# Patient Record
Sex: FEMALE | Race: WHITE | NOT HISPANIC OR LATINO | URBAN - METROPOLITAN AREA
[De-identification: names, ages, dates, MRNs, and addresses within clinical notes are randomized per-mention and may not be internally consistent; named-entity substitution may affect disease eponyms.]

---

## 2020-10-01 ENCOUNTER — OFFICE VISIT (OUTPATIENT)
Dept: URBAN - METROPOLITAN AREA CLINIC 17 | Facility: CLINIC | Age: 65
End: 2020-10-01
Payer: COMMERCIAL

## 2020-10-01 DIAGNOSIS — H17.89 OTHER CORNEAL SCARS: ICD-10-CM

## 2020-10-01 DIAGNOSIS — H25.812 COMBINED FORMS OF AGE-RELATED CATARACT, LEFT EYE: Primary | ICD-10-CM

## 2020-10-01 PROCEDURE — 92004 COMPRE OPH EXAM NEW PT 1/>: CPT | Performed by: OPHTHALMOLOGY

## 2020-10-01 ASSESSMENT — VISUAL ACUITY
OD: 20/25
OS: 20/70

## 2020-10-01 ASSESSMENT — KERATOMETRY
OS: 40.00
OD: 40.00

## 2020-10-01 ASSESSMENT — INTRAOCULAR PRESSURE
OD: 16
OS: 17

## 2020-10-01 NOTE — IMPRESSION/PLAN
Impression: Other corneal scars: H17.89. S/P RK OU Plan: Discussed diagnosis in detail with patient. Advised patient of condition. No treatment is required at this time. Will continue to observe condition and or symptoms. Patient instructed to use artificial tears as needed.

## 2020-10-01 NOTE — IMPRESSION/PLAN
Impression: Combined forms of age-related cataract, left eye: H25.812. Condition: established, worsening. Symptoms: could improve with surgery. Plan: Cataract accounts for patient's complaints. Reviewed risks, benefits, and procedure. Patient desires surgery, schedule ce/iol OS, RL2, standard IOL, distance refractive target, patient is clear for surgery in [place] [id number]. Discussed with patient the possible need for second surgery due to history of LASIK. Recommend ORA and Nazario in [place] to help prevent secondary surgery.

## 2020-10-09 ENCOUNTER — PRE-OPERATIVE VISIT (OUTPATIENT)
Dept: URBAN - METROPOLITAN AREA CLINIC 23 | Facility: CLINIC | Age: 65
End: 2020-10-09
Payer: COMMERCIAL

## 2020-10-09 PROCEDURE — 76519 ECHO EXAM OF EYE: CPT | Performed by: OPHTHALMOLOGY

## 2020-10-09 ASSESSMENT — PACHYMETRY
OS: 3.93
OD: 24.75
OD: 3.92
OS: 24.63

## 2020-11-11 ENCOUNTER — SURGERY (OUTPATIENT)
Dept: URBAN - METROPOLITAN AREA SURGERY 11 | Facility: SURGERY | Age: 65
End: 2020-11-11
Payer: COMMERCIAL

## 2020-11-11 PROCEDURE — 66984 XCAPSL CTRC RMVL W/O ECP: CPT | Performed by: OPHTHALMOLOGY

## 2020-11-12 ENCOUNTER — POST-OPERATIVE VISIT (OUTPATIENT)
Dept: URBAN - METROPOLITAN AREA CLINIC 17 | Facility: CLINIC | Age: 65
End: 2020-11-12
Payer: COMMERCIAL

## 2020-11-12 DIAGNOSIS — Z48.810 ENCOUNTER FOR SURGICAL AFTERCARE FOLLOWING SURGERY ON A SENSE ORGAN: Primary | ICD-10-CM

## 2020-11-12 PROCEDURE — 99024 POSTOP FOLLOW-UP VISIT: CPT | Performed by: OPTOMETRIST

## 2020-11-12 ASSESSMENT — INTRAOCULAR PRESSURE
OD: 23
OS: 24

## 2020-11-12 NOTE — IMPRESSION/PLAN
Impression: S/P Cataract Extraction by phacoemulsification with IOL placement/ORA OS - 1 Day. Encounter for surgical aftercare following surgery on a sense organ  Z48.810.  Excellent post op course Plan: --Taper Prednisolone acetate 1% QID OS  x 2 wk, TID x 1 wk, BID x 1wk, QD x 1wk, then d/c
--Continue Ofloxacin 0.3% QID OS

## 2020-11-19 ENCOUNTER — POST-OPERATIVE VISIT (OUTPATIENT)
Dept: URBAN - METROPOLITAN AREA CLINIC 17 | Facility: CLINIC | Age: 65
End: 2020-11-19
Payer: COMMERCIAL

## 2020-11-19 PROCEDURE — 99024 POSTOP FOLLOW-UP VISIT: CPT | Performed by: OPTOMETRIST

## 2020-11-19 ASSESSMENT — INTRAOCULAR PRESSURE
OS: 10
OD: 13

## 2020-11-19 ASSESSMENT — VISUAL ACUITY: OS: 20/30-2

## 2020-11-19 NOTE — IMPRESSION/PLAN
Impression: S/P Cataract Extraction by phacoemulsification with IOL placement/ORA OS - 8 Days. Encounter for surgical aftercare following surgery on a sense organ  Z48.810.  Plan: 3 wks, PO3 --Taper Prednisolone acetate 1% QID x 1 wk, TID x 1 wk, BID x 1wk, QD x 1wk, then d/c

## 2020-12-10 ENCOUNTER — POST-OPERATIVE VISIT (OUTPATIENT)
Dept: URBAN - METROPOLITAN AREA CLINIC 17 | Facility: CLINIC | Age: 65
End: 2020-12-10
Payer: COMMERCIAL

## 2020-12-10 PROCEDURE — 99024 POSTOP FOLLOW-UP VISIT: CPT | Performed by: OPTOMETRIST

## 2020-12-10 ASSESSMENT — INTRAOCULAR PRESSURE
OD: 14
OS: 14

## 2020-12-10 NOTE — IMPRESSION/PLAN
Impression: S/P Cataract Extraction by phacoemulsification with IOL placement/ORA OS - 29 Days. Encounter for surgical aftercare following surgery on a sense organ  Z48.810.  Excellent post op course Plan: --Continue Prednisolone acetate 1%OD QD x week

## 2021-03-10 ENCOUNTER — OFFICE VISIT (OUTPATIENT)
Dept: URBAN - METROPOLITAN AREA CLINIC 17 | Facility: CLINIC | Age: 66
End: 2021-03-10
Payer: COMMERCIAL

## 2021-03-10 DIAGNOSIS — H26.491 OTHER SECONDARY CATARACT OF RIGHT EYE: Primary | ICD-10-CM

## 2021-03-10 PROCEDURE — 92014 COMPRE OPH EXAM EST PT 1/>: CPT | Performed by: OPTOMETRIST

## 2021-03-10 ASSESSMENT — INTRAOCULAR PRESSURE
OS: 12
OD: 14

## 2022-03-09 ENCOUNTER — OFFICE VISIT (OUTPATIENT)
Dept: URBAN - METROPOLITAN AREA CLINIC 17 | Facility: CLINIC | Age: 67
End: 2022-03-09
Payer: COMMERCIAL

## 2022-03-09 DIAGNOSIS — H04.123 DRY EYE SYNDROME OF BILATERAL LACRIMAL GLANDS: ICD-10-CM

## 2022-03-09 DIAGNOSIS — H25.11 AGE-RELATED NUCLEAR CATARACT OF RIGHT EYE: Primary | ICD-10-CM

## 2022-03-09 PROCEDURE — 92014 COMPRE OPH EXAM EST PT 1/>: CPT | Performed by: OPTOMETRIST

## 2022-03-09 ASSESSMENT — INTRAOCULAR PRESSURE
OD: 16
OS: 16

## 2022-03-09 NOTE — IMPRESSION/PLAN
Impression: Other corneal scar: H17.89. Plan: Discussed diagnosis in detail with patient. No treatment is required at this time. Will continue to observe condition and or symptoms. Patient instructed to call if condition gets worse.

## 2024-03-13 ENCOUNTER — OFFICE VISIT (OUTPATIENT)
Dept: URBAN - METROPOLITAN AREA CLINIC 17 | Facility: CLINIC | Age: 69
End: 2024-03-13
Payer: MEDICARE

## 2024-03-13 DIAGNOSIS — H17.89 OTHER CORNEAL SCAR: ICD-10-CM

## 2024-03-13 DIAGNOSIS — H43.811 VITREOUS DEGENERATION, RIGHT EYE: ICD-10-CM

## 2024-03-13 DIAGNOSIS — H04.123 DRY EYE SYNDROME OF BILATERAL LACRIMAL GLANDS: ICD-10-CM

## 2024-03-13 DIAGNOSIS — H25.811 COMBINED FORMS OF AGE-RELATED CATARACT, RIGHT EYE: Primary | ICD-10-CM

## 2024-03-13 PROCEDURE — 92014 COMPRE OPH EXAM EST PT 1/>: CPT | Performed by: OPTOMETRIST

## 2024-03-13 ASSESSMENT — INTRAOCULAR PRESSURE
OS: 14
OD: 13

## 2025-03-21 ENCOUNTER — OFFICE VISIT (OUTPATIENT)
Dept: URBAN - METROPOLITAN AREA CLINIC 17 | Facility: CLINIC | Age: 70
End: 2025-03-21
Payer: MEDICARE

## 2025-03-21 DIAGNOSIS — H04.123 DRY EYE SYNDROME OF BILATERAL LACRIMAL GLANDS: Primary | ICD-10-CM

## 2025-03-21 DIAGNOSIS — H26.492 OTHER SECONDARY CATARACT OF LEFT EYE: ICD-10-CM

## 2025-03-21 DIAGNOSIS — H25.811 COMBINED FORMS OF AGE-RELATED CATARACT, RIGHT EYE: ICD-10-CM

## 2025-03-21 DIAGNOSIS — H43.811 VITREOUS DEGENERATION, RIGHT EYE: ICD-10-CM

## 2025-03-21 DIAGNOSIS — H17.89 OTHER CORNEAL SCARS AND OPACITIES: ICD-10-CM

## 2025-03-21 PROCEDURE — 92014 COMPRE OPH EXAM EST PT 1/>: CPT | Performed by: OPTOMETRIST

## 2025-03-21 ASSESSMENT — INTRAOCULAR PRESSURE
OS: 17
OD: 16

## 2025-04-07 ENCOUNTER — OFFICE VISIT (OUTPATIENT)
Dept: URBAN - METROPOLITAN AREA CLINIC 17 | Facility: CLINIC | Age: 70
End: 2025-04-07
Payer: MEDICARE

## 2025-04-07 DIAGNOSIS — H17.89 OTHER CORNEAL SCARS: ICD-10-CM

## 2025-04-07 DIAGNOSIS — Z96.1 PRESENCE OF PSEUDOPHAKIA: ICD-10-CM

## 2025-04-07 DIAGNOSIS — H25.811 COMBINED FORMS OF AGE-RELATED CATARACT, RIGHT EYE: Primary | ICD-10-CM

## 2025-04-07 PROCEDURE — 99204 OFFICE O/P NEW MOD 45 MIN: CPT | Performed by: OPHTHALMOLOGY

## 2025-04-07 RX ORDER — OFLOXACIN 3 MG/ML
0.3 % SOLUTION/ DROPS OPHTHALMIC
Qty: 5 | Refills: 1 | Status: ACTIVE
Start: 2025-04-07

## 2025-04-07 RX ORDER — PREDNISOLONE ACETATE 10 MG/ML
1 % SUSPENSION/ DROPS OPHTHALMIC
Qty: 10 | Refills: 1 | Status: ACTIVE
Start: 2025-04-07

## 2025-04-07 ASSESSMENT — VISUAL ACUITY
OD: 20/40
OS: 20/20

## 2025-04-07 ASSESSMENT — KERATOMETRY
OS: 40.63
OD: 39.75

## 2025-04-16 ENCOUNTER — TECH ONLY (OUTPATIENT)
Dept: URBAN - METROPOLITAN AREA CLINIC 17 | Facility: CLINIC | Age: 70
End: 2025-04-16
Payer: MEDICARE

## 2025-04-16 DIAGNOSIS — H25.811 COMBINED FORMS OF AGE-RELATED CATARACT, RIGHT EYE: Primary | ICD-10-CM

## 2025-04-16 DIAGNOSIS — H17.89 OTHER CORNEAL SCARS AND OPACITIES: ICD-10-CM

## 2025-04-16 ASSESSMENT — PACHYMETRY
OD: 24.70
OD: 3.96

## 2025-05-29 ENCOUNTER — SURGERY (OUTPATIENT)
Dept: URBAN - METROPOLITAN AREA SURGERY 7 | Facility: SURGERY | Age: 70
End: 2025-05-29
Payer: MEDICARE

## 2025-05-29 PROCEDURE — 66984 XCAPSL CTRC RMVL W/O ECP: CPT | Performed by: OPHTHALMOLOGY

## 2025-05-30 ENCOUNTER — POST-OPERATIVE VISIT (OUTPATIENT)
Dept: URBAN - METROPOLITAN AREA CLINIC 17 | Facility: CLINIC | Age: 70
End: 2025-05-30
Payer: MEDICARE

## 2025-05-30 DIAGNOSIS — Z96.1 PRESENCE OF INTRAOCULAR LENS: Primary | ICD-10-CM

## 2025-05-30 ASSESSMENT — INTRAOCULAR PRESSURE
OS: 16
OD: 14

## 2025-06-05 ENCOUNTER — POST-OPERATIVE VISIT (OUTPATIENT)
Dept: URBAN - METROPOLITAN AREA CLINIC 17 | Facility: CLINIC | Age: 70
End: 2025-06-05
Payer: MEDICARE

## 2025-06-05 DIAGNOSIS — Z96.1 PRESENCE OF INTRAOCULAR LENS: Primary | ICD-10-CM

## 2025-06-05 ASSESSMENT — INTRAOCULAR PRESSURE
OD: 15
OS: 14

## 2025-06-05 ASSESSMENT — VISUAL ACUITY: OD: 20/30

## 2025-06-26 ENCOUNTER — POST-OPERATIVE VISIT (OUTPATIENT)
Dept: URBAN - METROPOLITAN AREA CLINIC 17 | Facility: CLINIC | Age: 70
End: 2025-06-26
Payer: MEDICARE

## 2025-06-26 DIAGNOSIS — Z96.1 PRESENCE OF INTRAOCULAR LENS: Primary | ICD-10-CM

## 2025-06-26 ASSESSMENT — INTRAOCULAR PRESSURE
OS: 14
OD: 16